# Patient Record
(demographics unavailable — no encounter records)

---

## 2024-12-27 NOTE — HISTORY OF PRESENT ILLNESS
[de-identified] : This patient presents for a recurrent right inguinal hernia.  He did recently have an MRI that apparently showed this hernia.  He reports that he has had multiple hernia repairs in the past.  He and his wife are not sure if any of them are laparoscopic.  He now complains of intermittent painful, reducible mass in the right inguinal region.  His last repair was about a year ago.  He denies any GI complaints.  He denies any chronic cough.  He does have some urinary issues but is on medications for BPH.

## 2024-12-27 NOTE — ASSESSMENT
[FreeTextEntry1] : This patient has a recurrent right inguinal hernia after multiple prior repairs most recently a year ago.  It is symptomatic.  Because of his cardiomyopathy I do not believe we should attempt this laparoscopically or robotically because this require general anesthesia.  We will therefore plan to do an open recurrent hernia repair with mesh.  Risks and benefits of the procedure including bleeding, infection, injury to the testicular vessels, chronic pain and recurrence were discussed with the patient who understands and agrees to the surgery.  We will have him see his primary care doctor Dr. Baires for preop evaluation.  He will obviously have to stop his Eliquis for 2 days prior to the surgery.

## 2024-12-27 NOTE — PHYSICAL EXAM
[de-identified] : His abdomen is soft, nondistended and nontender.  There are bilateral incisions from prior hernia repairs.  He does have a palpable, reducible hernia under the incision in the mid lateral right inguinal region.  The testicles are normally descended.

## 2025-01-24 NOTE — HISTORY OF PRESENT ILLNESS
[de-identified] : This patient is status post open repair of recurrent right inguinal hernia.  He reports that he was having some pain that is slowly resolving as well as a swelling under his incision.  He denies any fevers, chills or issues with the wound.  He is eating and drinking well and able to remain active.

## 2025-01-24 NOTE — ASSESSMENT
[FreeTextEntry1] : The patient is doing well after his recurrent inguinal hernia repair.  I would like to the patient 1 more time to make sure that he this heals well.  He is going to [place] for several weeks.  I will see him back in about 6 weeks when he returns.

## 2025-01-24 NOTE — PHYSICAL EXAM
[de-identified] : His right inguinal wound is well-healed with a normal ridge of fibrosis.  There is no evidence of infection.  The testicle is normally descended in the scrotum.

## 2025-03-28 NOTE — PHYSICAL EXAM
[de-identified] : He has a nicely healing incision on his right inguinal region.  There is no evidence of infection.  There is no palpable hernia.

## 2025-03-28 NOTE — HISTORY OF PRESENT ILLNESS
[de-identified] : The patient returns after repair of a recurrent right inguinal hernia.  He has no complaints at this time.  He spent the last several weeks in [place] and has had no issues.

## 2025-03-28 NOTE — ASSESSMENT
[FreeTextEntry1] : The patient is recovering well from his surgery.  He can return to full activity as tolerated.  He can follow-up with his primary care doctor.  I be happy to see him back if any other issues arise.